# Patient Record
Sex: MALE | Race: WHITE | NOT HISPANIC OR LATINO | Employment: OTHER | URBAN - METROPOLITAN AREA
[De-identification: names, ages, dates, MRNs, and addresses within clinical notes are randomized per-mention and may not be internally consistent; named-entity substitution may affect disease eponyms.]

---

## 2024-04-19 ENCOUNTER — HOSPITAL ENCOUNTER (OUTPATIENT)
Facility: MEDICAL CENTER | Age: 44
End: 2024-04-20
Attending: STUDENT IN AN ORGANIZED HEALTH CARE EDUCATION/TRAINING PROGRAM | Admitting: STUDENT IN AN ORGANIZED HEALTH CARE EDUCATION/TRAINING PROGRAM
Payer: COMMERCIAL

## 2024-04-19 DIAGNOSIS — S02.5XXA CLOSED FRACTURE OF TOOTH, INITIAL ENCOUNTER: ICD-10-CM

## 2024-04-19 DIAGNOSIS — H74.8X2 HEMATOTYMPANUM OF LEFT EAR: ICD-10-CM

## 2024-04-19 DIAGNOSIS — R55 SYNCOPE, UNSPECIFIED SYNCOPE TYPE: ICD-10-CM

## 2024-04-19 DIAGNOSIS — D67 HEMOPHILIA B (HCC): ICD-10-CM

## 2024-04-19 DIAGNOSIS — S01.81XA CHIN LACERATION, INITIAL ENCOUNTER: ICD-10-CM

## 2024-04-19 DIAGNOSIS — S09.90XA CLOSED HEAD INJURY, INITIAL ENCOUNTER: ICD-10-CM

## 2024-04-19 PROCEDURE — 304217 HCHG IRRIGATION SYSTEM

## 2024-04-19 PROCEDURE — 36415 COLL VENOUS BLD VENIPUNCTURE: CPT

## 2024-04-19 PROCEDURE — 85576 BLOOD PLATELET AGGREGATION: CPT | Mod: 91

## 2024-04-19 PROCEDURE — 86850 RBC ANTIBODY SCREEN: CPT

## 2024-04-19 PROCEDURE — 96375 TX/PRO/DX INJ NEW DRUG ADDON: CPT

## 2024-04-19 PROCEDURE — 303747 HCHG EXTRA SUTURE

## 2024-04-19 PROCEDURE — 96376 TX/PRO/DX INJ SAME DRUG ADON: CPT

## 2024-04-19 PROCEDURE — 80053 COMPREHEN METABOLIC PANEL: CPT

## 2024-04-19 PROCEDURE — 99285 EMERGENCY DEPT VISIT HI MDM: CPT

## 2024-04-19 PROCEDURE — 96366 THER/PROPH/DIAG IV INF ADDON: CPT

## 2024-04-19 PROCEDURE — 85347 COAGULATION TIME ACTIVATED: CPT

## 2024-04-19 PROCEDURE — 85025 COMPLETE CBC W/AUTO DIFF WBC: CPT

## 2024-04-19 PROCEDURE — 86900 BLOOD TYPING SEROLOGIC ABO: CPT

## 2024-04-19 PROCEDURE — 86901 BLOOD TYPING SEROLOGIC RH(D): CPT

## 2024-04-19 PROCEDURE — 85384 FIBRINOGEN ACTIVITY: CPT | Mod: 91

## 2024-04-19 PROCEDURE — 84484 ASSAY OF TROPONIN QUANT: CPT

## 2024-04-19 PROCEDURE — 96365 THER/PROPH/DIAG IV INF INIT: CPT

## 2024-04-19 PROCEDURE — 304999 HCHG REPAIR-SIMPLE/INTERMED LEVEL 1

## 2024-04-20 ENCOUNTER — APPOINTMENT (OUTPATIENT)
Dept: RADIOLOGY | Facility: MEDICAL CENTER | Age: 44
End: 2024-04-20
Attending: STUDENT IN AN ORGANIZED HEALTH CARE EDUCATION/TRAINING PROGRAM
Payer: COMMERCIAL

## 2024-04-20 ENCOUNTER — PHARMACY VISIT (OUTPATIENT)
Dept: PHARMACY | Facility: MEDICAL CENTER | Age: 44
End: 2024-04-20
Payer: COMMERCIAL

## 2024-04-20 ENCOUNTER — APPOINTMENT (OUTPATIENT)
Dept: CARDIOLOGY | Facility: MEDICAL CENTER | Age: 44
End: 2024-04-20
Attending: HOSPITALIST
Payer: COMMERCIAL

## 2024-04-20 VITALS
SYSTOLIC BLOOD PRESSURE: 131 MMHG | HEIGHT: 74 IN | DIASTOLIC BLOOD PRESSURE: 84 MMHG | WEIGHT: 209 LBS | RESPIRATION RATE: 16 BRPM | TEMPERATURE: 98.6 F | OXYGEN SATURATION: 94 % | HEART RATE: 72 BPM | BODY MASS INDEX: 26.82 KG/M2
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
## 2024-04-20 PROBLEM — R55 SYNCOPE: Status: ACTIVE | Noted: 2024-04-20

## 2024-04-20 PROBLEM — E87.6 HYPOKALEMIA: Status: RESOLVED | Noted: 2024-04-20 | Resolved: 2024-04-20

## 2024-04-20 PROBLEM — S02.670A: Status: ACTIVE | Noted: 2024-04-20
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
## 2024-04-20 PROBLEM — D67 HEMOPHILIA B (HCC): Status: ACTIVE | Noted: 2024-04-20

## 2024-04-20 PROBLEM — H74.8X9 HEMOTYMPANUM: Status: ACTIVE | Noted: 2024-04-20

## 2024-04-20 PROBLEM — E87.6 HYPOKALEMIA: Status: ACTIVE | Noted: 2024-04-20

## 2024-04-20 PROBLEM — R55 SYNCOPE: Status: RESOLVED | Noted: 2024-04-20 | Resolved: 2024-04-20

## 2024-04-20 LAB
ABO + RH BLD: NORMAL
ABO GROUP BLD: NORMAL
ALBUMIN SERPL BCP-MCNC: 4.2 G/DL (ref 3.2–4.9)
ALBUMIN SERPL BCP-MCNC: 4.2 G/DL (ref 3.2–4.9)
ALBUMIN/GLOB SERPL: 1.4 G/DL
ALBUMIN/GLOB SERPL: 1.6 G/DL
ALP SERPL-CCNC: 58 U/L (ref 30–99)
ALP SERPL-CCNC: 59 U/L (ref 30–99)
ALT SERPL-CCNC: 27 U/L (ref 2–50)
ALT SERPL-CCNC: 28 U/L (ref 2–50)
ANION GAP SERPL CALC-SCNC: 11 MMOL/L (ref 7–16)
ANION GAP SERPL CALC-SCNC: 18 MMOL/L (ref 7–16)
APTT PPP: 31.4 SEC (ref 24.7–36)
AST SERPL-CCNC: 24 U/L (ref 12–45)
AST SERPL-CCNC: 29 U/L (ref 12–45)
BASOPHILS # BLD AUTO: 0.4 % (ref 0–1.8)
BASOPHILS # BLD AUTO: 0.7 % (ref 0–1.8)
BASOPHILS # BLD: 0.04 K/UL (ref 0–0.12)
BASOPHILS # BLD: 0.07 K/UL (ref 0–0.12)
BILIRUB SERPL-MCNC: 0.2 MG/DL (ref 0.1–1.5)
BILIRUB SERPL-MCNC: 0.4 MG/DL (ref 0.1–1.5)
BLD GP AB SCN SERPL QL: NORMAL
BUN SERPL-MCNC: 15 MG/DL (ref 8–22)
BUN SERPL-MCNC: 16 MG/DL (ref 8–22)
CALCIUM ALBUM COR SERPL-MCNC: 8.7 MG/DL (ref 8.5–10.5)
CALCIUM ALBUM COR SERPL-MCNC: 9 MG/DL (ref 8.5–10.5)
CALCIUM SERPL-MCNC: 8.9 MG/DL (ref 8.5–10.5)
CALCIUM SERPL-MCNC: 9.2 MG/DL (ref 8.5–10.5)
CFT BLD TEG: 5.7 MIN (ref 4.6–9.1)
CFT P HPASE BLD TEG: 3.9 MIN (ref 4.3–8.3)
CHLORIDE SERPL-SCNC: 103 MMOL/L (ref 96–112)
CHLORIDE SERPL-SCNC: 106 MMOL/L (ref 96–112)
CLOT ANGLE BLD TEG: 75.2 DEGREES (ref 63–78)
CLOT LYSIS 30M P MA LENFR BLD TEG: 0.2 % (ref 0–2.6)
CO2 SERPL-SCNC: 18 MMOL/L (ref 20–33)
CO2 SERPL-SCNC: 25 MMOL/L (ref 20–33)
CREAT SERPL-MCNC: 0.69 MG/DL (ref 0.5–1.4)
CREAT SERPL-MCNC: 0.9 MG/DL (ref 0.5–1.4)
CT.EXTRINSIC BLD ROTEM: 1.1 MIN (ref 0.8–2.1)
EKG IMPRESSION: NORMAL
EOSINOPHIL # BLD AUTO: 0.17 K/UL (ref 0–0.51)
EOSINOPHIL # BLD AUTO: 0.21 K/UL (ref 0–0.51)
EOSINOPHIL NFR BLD: 1.6 % (ref 0–6.9)
EOSINOPHIL NFR BLD: 2 % (ref 0–6.9)
ERYTHROCYTE [DISTWIDTH] IN BLOOD BY AUTOMATED COUNT: 40.7 FL (ref 35.9–50)
ERYTHROCYTE [DISTWIDTH] IN BLOOD BY AUTOMATED COUNT: 41.6 FL (ref 35.9–50)
FACT IX ACT/NOR PPP: 106 % (ref 75–125)
FACT VIII ACT/NOR PPP: 340 % (ref 45–145)
FLUAV RNA SPEC QL NAA+PROBE: NEGATIVE
FLUBV RNA SPEC QL NAA+PROBE: NEGATIVE
GFR SERPLBLD CREATININE-BSD FMLA CKD-EPI: 108 ML/MIN/1.73 M 2
GFR SERPLBLD CREATININE-BSD FMLA CKD-EPI: 117 ML/MIN/1.73 M 2
GLOBULIN SER CALC-MCNC: 2.7 G/DL (ref 1.9–3.5)
GLOBULIN SER CALC-MCNC: 2.9 G/DL (ref 1.9–3.5)
GLUCOSE SERPL-MCNC: 128 MG/DL (ref 65–99)
GLUCOSE SERPL-MCNC: 95 MG/DL (ref 65–99)
HCT VFR BLD AUTO: 42.4 % (ref 42–52)
HCT VFR BLD AUTO: 43.4 % (ref 42–52)
HGB BLD-MCNC: 14.4 G/DL (ref 14–18)
HGB BLD-MCNC: 14.9 G/DL (ref 14–18)
IMM GRANULOCYTES # BLD AUTO: 0.02 K/UL (ref 0–0.11)
IMM GRANULOCYTES # BLD AUTO: 0.03 K/UL (ref 0–0.11)
IMM GRANULOCYTES NFR BLD AUTO: 0.2 % (ref 0–0.9)
IMM GRANULOCYTES NFR BLD AUTO: 0.3 % (ref 0–0.9)
INHIBITOR INDICATED 1863: NO
INHIBITOR INDICATED 1863: NO
INR PPP: 0.94 (ref 0.87–1.13)
LV EJECT FRACT  99904: 58
LV EJECT FRACT MOD 2C 99903: 59.95
LV EJECT FRACT MOD 4C 99902: 55.13
LV EJECT FRACT MOD BP 99901: 57.91
LYMPHOCYTES # BLD AUTO: 2.4 K/UL (ref 1–4.8)
LYMPHOCYTES # BLD AUTO: 3.81 K/UL (ref 1–4.8)
LYMPHOCYTES NFR BLD: 22 % (ref 22–41)
LYMPHOCYTES NFR BLD: 36.8 % (ref 22–41)
MCF BLD TEG: 63.1 MM (ref 52–69)
MCF.PLATELET INHIB BLD ROTEM: 20 MM (ref 15–32)
MCH RBC QN AUTO: 30.2 PG (ref 27–33)
MCH RBC QN AUTO: 30.8 PG (ref 27–33)
MCHC RBC AUTO-ENTMCNC: 34 G/DL (ref 32.3–36.5)
MCHC RBC AUTO-ENTMCNC: 34.3 G/DL (ref 32.3–36.5)
MCV RBC AUTO: 88.9 FL (ref 81.4–97.8)
MCV RBC AUTO: 89.9 FL (ref 81.4–97.8)
MONOCYTES # BLD AUTO: 0.56 K/UL (ref 0–0.85)
MONOCYTES # BLD AUTO: 0.73 K/UL (ref 0–0.85)
MONOCYTES NFR BLD AUTO: 5.4 % (ref 0–13.4)
MONOCYTES NFR BLD AUTO: 6.7 % (ref 0–13.4)
NEUTROPHILS # BLD AUTO: 5.67 K/UL (ref 1.82–7.42)
NEUTROPHILS # BLD AUTO: 7.54 K/UL (ref 1.82–7.42)
NEUTROPHILS NFR BLD: 54.8 % (ref 44–72)
NEUTROPHILS NFR BLD: 69.1 % (ref 44–72)
NRBC # BLD AUTO: 0 K/UL
NRBC # BLD AUTO: 0 K/UL
NRBC BLD-RTO: 0 /100 WBC (ref 0–0.2)
NRBC BLD-RTO: 0 /100 WBC (ref 0–0.2)
PA AA BLD-ACNC: 0 % (ref 0–11)
PA ADP BLD-ACNC: 12.3 % (ref 0–17)
PLATELET # BLD AUTO: 243 K/UL (ref 164–446)
PLATELET # BLD AUTO: 287 K/UL (ref 164–446)
PMV BLD AUTO: 11.3 FL (ref 9–12.9)
PMV BLD AUTO: 11.6 FL (ref 9–12.9)
POTASSIUM SERPL-SCNC: 3.3 MMOL/L (ref 3.6–5.5)
POTASSIUM SERPL-SCNC: 3.9 MMOL/L (ref 3.6–5.5)
PROT SERPL-MCNC: 6.9 G/DL (ref 6–8.2)
PROT SERPL-MCNC: 7.1 G/DL (ref 6–8.2)
PROTHROMBIN TIME: 12.7 SEC (ref 12–14.6)
RBC # BLD AUTO: 4.77 M/UL (ref 4.7–6.1)
RBC # BLD AUTO: 4.83 M/UL (ref 4.7–6.1)
RH BLD: NORMAL
RSV RNA SPEC QL NAA+PROBE: NEGATIVE
SARS-COV-2 RNA RESP QL NAA+PROBE: NOTDETECTED
SODIUM SERPL-SCNC: 139 MMOL/L (ref 135–145)
SODIUM SERPL-SCNC: 142 MMOL/L (ref 135–145)
TEG ALGORITHM TGALG: ABNORMAL
TROPONIN T SERPL-MCNC: <6 NG/L (ref 6–19)
WBC # BLD AUTO: 10.4 K/UL (ref 4.8–10.8)
WBC # BLD AUTO: 10.9 K/UL (ref 4.8–10.8)

## 2024-04-20 PROCEDURE — 0241U HCHG SARS-COV-2 COVID-19 NFCT DS RESP RNA 4 TRGT ED POC: CPT

## 2024-04-20 PROCEDURE — 303747 HCHG EXTRA SUTURE

## 2024-04-20 PROCEDURE — 96366 THER/PROPH/DIAG IV INF ADDON: CPT

## 2024-04-20 PROCEDURE — 93306 TTE W/DOPPLER COMPLETE: CPT

## 2024-04-20 PROCEDURE — 99223 1ST HOSP IP/OBS HIGH 75: CPT | Performed by: STUDENT IN AN ORGANIZED HEALTH CARE EDUCATION/TRAINING PROGRAM

## 2024-04-20 PROCEDURE — 96375 TX/PRO/DX INJ NEW DRUG ADDON: CPT

## 2024-04-20 PROCEDURE — 700111 HCHG RX REV CODE 636 W/ 250 OVERRIDE (IP): Mod: JZ | Performed by: STUDENT IN AN ORGANIZED HEALTH CARE EDUCATION/TRAINING PROGRAM

## 2024-04-20 PROCEDURE — 304999 HCHG REPAIR-SIMPLE/INTERMED LEVEL 1

## 2024-04-20 PROCEDURE — 304217 HCHG IRRIGATION SYSTEM

## 2024-04-20 PROCEDURE — A9270 NON-COVERED ITEM OR SERVICE: HCPCS | Performed by: STUDENT IN AN ORGANIZED HEALTH CARE EDUCATION/TRAINING PROGRAM

## 2024-04-20 PROCEDURE — 96365 THER/PROPH/DIAG IV INF INIT: CPT

## 2024-04-20 PROCEDURE — 85250 CLOT FACTOR IX PTC/CHRSTMAS: CPT

## 2024-04-20 PROCEDURE — 700101 HCHG RX REV CODE 250: Performed by: STUDENT IN AN ORGANIZED HEALTH CARE EDUCATION/TRAINING PROGRAM

## 2024-04-20 PROCEDURE — 700105 HCHG RX REV CODE 258: Performed by: STUDENT IN AN ORGANIZED HEALTH CARE EDUCATION/TRAINING PROGRAM

## 2024-04-20 PROCEDURE — 85270 CLOT FACTOR XI PTA: CPT

## 2024-04-20 PROCEDURE — 700101 HCHG RX REV CODE 250: Performed by: HOSPITALIST

## 2024-04-20 PROCEDURE — G0378 HOSPITAL OBSERVATION PER HR: HCPCS

## 2024-04-20 PROCEDURE — 80053 COMPREHEN METABOLIC PANEL: CPT

## 2024-04-20 PROCEDURE — 700102 HCHG RX REV CODE 250 W/ 637 OVERRIDE(OP): Performed by: STUDENT IN AN ORGANIZED HEALTH CARE EDUCATION/TRAINING PROGRAM

## 2024-04-20 PROCEDURE — 93005 ELECTROCARDIOGRAM TRACING: CPT | Performed by: STUDENT IN AN ORGANIZED HEALTH CARE EDUCATION/TRAINING PROGRAM

## 2024-04-20 PROCEDURE — 36415 COLL VENOUS BLD VENIPUNCTURE: CPT

## 2024-04-20 PROCEDURE — 93306 TTE W/DOPPLER COMPLETE: CPT | Mod: 26 | Performed by: INTERNAL MEDICINE

## 2024-04-20 PROCEDURE — RXMED WILLOW AMBULATORY MEDICATION CHARGE: Performed by: HOSPITALIST

## 2024-04-20 PROCEDURE — 85610 PROTHROMBIN TIME: CPT

## 2024-04-20 PROCEDURE — 70450 CT HEAD/BRAIN W/O DYE: CPT

## 2024-04-20 PROCEDURE — 71045 X-RAY EXAM CHEST 1 VIEW: CPT

## 2024-04-20 PROCEDURE — 85730 THROMBOPLASTIN TIME PARTIAL: CPT

## 2024-04-20 PROCEDURE — 85240 CLOT FACTOR VIII AHG 1 STAGE: CPT

## 2024-04-20 PROCEDURE — 85025 COMPLETE CBC W/AUTO DIFF WBC: CPT

## 2024-04-20 PROCEDURE — 51798 US URINE CAPACITY MEASURE: CPT

## 2024-04-20 PROCEDURE — 72125 CT NECK SPINE W/O DYE: CPT

## 2024-04-20 PROCEDURE — 96376 TX/PRO/DX INJ SAME DRUG ADON: CPT

## 2024-04-20 PROCEDURE — 70486 CT MAXILLOFACIAL W/O DYE: CPT

## 2024-04-20 RX ORDER — LORATADINE 10 MG/1
10 TABLET ORAL DAILY
COMMUNITY

## 2024-04-20 RX ORDER — LOSARTAN POTASSIUM 50 MG/1
25 TABLET ORAL DAILY
Status: SHIPPED
Start: 2024-04-20

## 2024-04-20 RX ORDER — HYDROMORPHONE HYDROCHLORIDE 1 MG/ML
0.5 INJECTION, SOLUTION INTRAMUSCULAR; INTRAVENOUS; SUBCUTANEOUS ONCE
Status: COMPLETED | OUTPATIENT
Start: 2024-04-20 | End: 2024-04-20

## 2024-04-20 RX ORDER — POTASSIUM CHLORIDE 20 MEQ/1
40 TABLET, EXTENDED RELEASE ORAL ONCE
Status: DISCONTINUED | OUTPATIENT
Start: 2024-04-20 | End: 2024-04-20 | Stop reason: HOSPADM

## 2024-04-20 RX ORDER — OMEPRAZOLE 40 MG/1
40 CAPSULE, DELAYED RELEASE ORAL DAILY
COMMUNITY

## 2024-04-20 RX ORDER — MORPHINE SULFATE 4 MG/ML
4 INJECTION INTRAVENOUS EVERY 4 HOURS PRN
Status: DISCONTINUED | OUTPATIENT
Start: 2024-04-20 | End: 2024-04-20 | Stop reason: HOSPADM

## 2024-04-20 RX ORDER — ACETAMINOPHEN 10 MG/ML
1000 INJECTION, SOLUTION INTRAVENOUS ONCE
Status: COMPLETED | OUTPATIENT
Start: 2024-04-20 | End: 2024-04-20

## 2024-04-20 RX ORDER — POTASSIUM CHLORIDE 7.45 MG/ML
10 INJECTION INTRAVENOUS
Status: COMPLETED | OUTPATIENT
Start: 2024-04-20 | End: 2024-04-20

## 2024-04-20 RX ORDER — ACETAMINOPHEN 500 MG
1000 TABLET ORAL ONCE
Status: DISCONTINUED | OUTPATIENT
Start: 2024-04-20 | End: 2024-04-20

## 2024-04-20 RX ORDER — LIDOCAINE HYDROCHLORIDE AND EPINEPHRINE 10; 10 MG/ML; UG/ML
10 INJECTION, SOLUTION INFILTRATION; PERINEURAL ONCE
Status: COMPLETED | OUTPATIENT
Start: 2024-04-20 | End: 2024-04-20

## 2024-04-20 RX ORDER — SODIUM CHLORIDE, SODIUM LACTATE, POTASSIUM CHLORIDE, CALCIUM CHLORIDE 600; 310; 30; 20 MG/100ML; MG/100ML; MG/100ML; MG/100ML
1000 INJECTION, SOLUTION INTRAVENOUS ONCE
Status: COMPLETED | OUTPATIENT
Start: 2024-04-20 | End: 2024-04-20

## 2024-04-20 RX ORDER — OXYCODONE HYDROCHLORIDE 5 MG/1
5 TABLET ORAL EVERY 4 HOURS PRN
Status: DISCONTINUED | OUTPATIENT
Start: 2024-04-20 | End: 2024-04-20 | Stop reason: HOSPADM

## 2024-04-20 RX ORDER — LOSARTAN POTASSIUM 50 MG/1
50 TABLET ORAL DAILY
Status: ON HOLD | COMMUNITY
End: 2024-04-20

## 2024-04-20 RX ORDER — SODIUM CHLORIDE 9 MG/ML
INJECTION, SOLUTION INTRAVENOUS CONTINUOUS
Status: ACTIVE | OUTPATIENT
Start: 2024-04-20 | End: 2024-04-20

## 2024-04-20 RX ORDER — HYDROMORPHONE HYDROCHLORIDE 1 MG/ML
1 INJECTION, SOLUTION INTRAMUSCULAR; INTRAVENOUS; SUBCUTANEOUS ONCE
Status: COMPLETED | OUTPATIENT
Start: 2024-04-20 | End: 2024-04-20

## 2024-04-20 RX ORDER — ACETAMINOPHEN 325 MG/1
650 TABLET ORAL EVERY 6 HOURS PRN
Status: DISCONTINUED | OUTPATIENT
Start: 2024-04-20 | End: 2024-04-20 | Stop reason: HOSPADM

## 2024-04-20 RX ORDER — CIPROFLOXACIN AND DEXAMETHASONE 3; 1 MG/ML; MG/ML
4 SUSPENSION/ DROPS AURICULAR (OTIC) 2 TIMES DAILY
Status: DISCONTINUED | OUTPATIENT
Start: 2024-04-20 | End: 2024-04-20 | Stop reason: HOSPADM

## 2024-04-20 RX ORDER — CIPROFLOXACIN AND DEXAMETHASONE 3; 1 MG/ML; MG/ML
4 SUSPENSION/ DROPS AURICULAR (OTIC) 2 TIMES DAILY
Qty: 7.5 ML | Refills: 0 | Status: SHIPPED | OUTPATIENT
Start: 2024-04-20 | End: 2024-04-27

## 2024-04-20 RX ORDER — DEXTROAMPHETAMINE SACCHARATE, AMPHETAMINE ASPARTATE, DEXTROAMPHETAMINE SULFATE AND AMPHETAMINE SULFATE 2.5; 2.5; 2.5; 2.5 MG/1; MG/1; MG/1; MG/1
10 TABLET ORAL DAILY
COMMUNITY

## 2024-04-20 RX ORDER — OXYCODONE HYDROCHLORIDE 5 MG/1
5 TABLET ORAL EVERY 6 HOURS PRN
Qty: 20 TABLET | Refills: 0 | Status: SHIPPED | OUTPATIENT
Start: 2024-04-20 | End: 2024-04-25

## 2024-04-20 RX ADMIN — OXYCODONE 5 MG: 5 TABLET ORAL at 09:39

## 2024-04-20 RX ADMIN — POTASSIUM CHLORIDE 10 MEQ: 2 INJECTION, SOLUTION, CONCENTRATE INTRAVENOUS at 05:17

## 2024-04-20 RX ADMIN — SODIUM CHLORIDE: 9 INJECTION, SOLUTION INTRAVENOUS at 05:15

## 2024-04-20 RX ADMIN — CIPROFLOXACIN AND DEXAMETHASONE 4 DROP: 3; 1 SUSPENSION/ DROPS AURICULAR (OTIC) at 11:21

## 2024-04-20 RX ADMIN — COAGULATION FACTOR IX (RECOMBINANT) 500 UNITS: KIT at 01:30

## 2024-04-20 RX ADMIN — POTASSIUM CHLORIDE 10 MEQ: 2 INJECTION, SOLUTION, CONCENTRATE INTRAVENOUS at 07:51

## 2024-04-20 RX ADMIN — HYDROMORPHONE HYDROCHLORIDE 1 MG: 1 INJECTION, SOLUTION INTRAMUSCULAR; INTRAVENOUS; SUBCUTANEOUS at 02:16

## 2024-04-20 RX ADMIN — MORPHINE SULFATE 4 MG: 4 INJECTION INTRAVENOUS at 06:01

## 2024-04-20 RX ADMIN — MORPHINE SULFATE 4 MG: 4 INJECTION INTRAVENOUS at 10:23

## 2024-04-20 RX ADMIN — LIDOCAINE HYDROCHLORIDE AND EPINEPHRINE 10 ML: 10; 10 INJECTION, SOLUTION INFILTRATION; PERINEURAL at 01:02

## 2024-04-20 RX ADMIN — HYDROMORPHONE HYDROCHLORIDE 0.5 MG: 1 INJECTION, SOLUTION INTRAMUSCULAR; INTRAVENOUS; SUBCUTANEOUS at 01:10

## 2024-04-20 RX ADMIN — POTASSIUM CHLORIDE 10 MEQ: 2 INJECTION, SOLUTION, CONCENTRATE INTRAVENOUS at 04:03

## 2024-04-20 RX ADMIN — HYDROMORPHONE HYDROCHLORIDE 0.5 MG: 1 INJECTION, SOLUTION INTRAMUSCULAR; INTRAVENOUS; SUBCUTANEOUS at 00:17

## 2024-04-20 RX ADMIN — SODIUM CHLORIDE, POTASSIUM CHLORIDE, SODIUM LACTATE AND CALCIUM CHLORIDE 1000 ML: 600; 310; 30; 20 INJECTION, SOLUTION INTRAVENOUS at 00:39

## 2024-04-20 RX ADMIN — ACETAMINOPHEN 1000 MG: 1000 INJECTION INTRAVENOUS at 02:26

## 2024-04-20 RX ADMIN — COAGULATION FACTOR IX (RECOMBINANT) 8000 UNITS: KIT at 01:23

## 2024-04-20 ASSESSMENT — PATIENT HEALTH QUESTIONNAIRE - PHQ9
SUM OF ALL RESPONSES TO PHQ9 QUESTIONS 1 AND 2: 0
1. LITTLE INTEREST OR PLEASURE IN DOING THINGS: NOT AT ALL
2. FEELING DOWN, DEPRESSED, IRRITABLE, OR HOPELESS: NOT AT ALL

## 2024-04-20 ASSESSMENT — PAIN DESCRIPTION - PAIN TYPE
TYPE: ACUTE PAIN

## 2024-04-20 ASSESSMENT — LIFESTYLE VARIABLES
HOW MANY TIMES IN THE PAST YEAR HAVE YOU HAD 5 OR MORE DRINKS IN A DAY: 0
DOES PATIENT WANT TO STOP DRINKING: NO
EVER FELT BAD OR GUILTY ABOUT YOUR DRINKING: NO
SUBSTANCE_ABUSE: 0
TOTAL SCORE: 0
ON A TYPICAL DAY WHEN YOU DRINK ALCOHOL HOW MANY DRINKS DO YOU HAVE: 2
ALCOHOL_USE: YES
HAVE PEOPLE ANNOYED YOU BY CRITICIZING YOUR DRINKING: NO
CONSUMPTION TOTAL: NEGATIVE
EVER HAD A DRINK FIRST THING IN THE MORNING TO STEADY YOUR NERVES TO GET RID OF A HANGOVER: NO
HAVE YOU EVER FELT YOU SHOULD CUT DOWN ON YOUR DRINKING: NO
AVERAGE NUMBER OF DAYS PER WEEK YOU HAVE A DRINK CONTAINING ALCOHOL: 0

## 2024-04-20 ASSESSMENT — ENCOUNTER SYMPTOMS
ORTHOPNEA: 0
EYE PAIN: 0
DEPRESSION: 0
DIZZINESS: 0
HEMOPTYSIS: 0
HALLUCINATIONS: 0
CHILLS: 0
NECK PAIN: 0
NERVOUS/ANXIOUS: 1
TINGLING: 0
ABDOMINAL PAIN: 0
SENSORY CHANGE: 0
FEVER: 0
FALLS: 1
INSOMNIA: 0
PHOTOPHOBIA: 0
MEMORY LOSS: 0
LOSS OF CONSCIOUSNESS: 1
PALPITATIONS: 0
SHORTNESS OF BREATH: 0
HEARTBURN: 0
NAUSEA: 0
DOUBLE VISION: 0
SINUS PAIN: 0
SPUTUM PRODUCTION: 0
MYALGIAS: 0
VOMITING: 0
EYE DISCHARGE: 0
BACK PAIN: 0
TREMORS: 0
DIARRHEA: 0
BRUISES/BLEEDS EASILY: 0
DIZZINESS: 1
COUGH: 0
STRIDOR: 0
EYE REDNESS: 0
WEIGHT LOSS: 0
HEADACHES: 1

## 2024-04-20 ASSESSMENT — FIBROSIS 4 INDEX: FIB4 SCORE: 0.82

## 2024-04-20 NOTE — H&P
Hospital Medicine History & Physical Note    Date of Service  4/20/2024    Primary Care Physician  Pcp Not In Computer    Consultants  Hematology    Specialist Names: Dr. Nava    Code Status  Full Code    Chief Complaint  Chief Complaint   Patient presents with    T-5000 FALL     BIB [facility] from [facility]. Stood up-syncope and fell. Laceration on the chin, no active bleed.. Patient appears uneasy, confused. Has hemophilia.        History of Presenting Illness  Tim Slater is a 43 y.o. male with past med history of hemophilia B who presented 4/19/2024 after ground-level fall.  Patient is here visiting from [place].  He was at the [employer] earlier today and he was drinking some alcohol.  States that he only had 1 drink.  Patient states that he was initially sitting at the bar drinking, he began to feel clammy and a bit nauseous.  [place] he states that his symptoms persisted.  As result he sat back down.  Upon standing, patient states that he had a syncopal episode.  Patient states that he has been feeling unwell over the last day or 2.  This is also paired with anxiety as well.  CT head was unremarkable CT C-spine was also negative.  CT maxillofacial was unremarkable.  He does have some dental fracture.  Given his hemophilia B, case was discussed with hematology.  They recommended 8000 units of factor IX.    I discussed the plan of care with patient and family.    Review of Systems  Review of Systems   Constitutional:  Negative for chills and fever.   HENT:  Negative for congestion, ear discharge, ear pain, nosebleeds, sinus pain and tinnitus.    Eyes:  Negative for photophobia, pain and discharge.   Respiratory:  Negative for cough, hemoptysis, sputum production and shortness of breath.    Cardiovascular:  Negative for chest pain, palpitations and orthopnea.   Gastrointestinal:  Negative for abdominal pain, diarrhea, nausea and vomiting.   Genitourinary:  Negative for frequency,  hematuria and urgency.   Musculoskeletal:  Positive for falls. Negative for back pain, joint pain, myalgias and neck pain.   Neurological:  Positive for dizziness, loss of consciousness and headaches.   Psychiatric/Behavioral:  Negative for depression, hallucinations, memory loss, substance abuse and suicidal ideas. The patient is nervous/anxious. The patient does not have insomnia.        Past Medical History   has no past medical history on file.    Surgical History   has no past surgical history on file.     Family History  family history is not on file.   Family history reviewed with patient. There is no family history that is pertinent to the chief complaint.     Social History       Allergies  No Known Allergies    Medications  None       Physical Exam  Temp:  [36.7 °C (98 °F)] 36.7 °C (98 °F)  Pulse:  [] 96  Resp:  [16-35] 16  BP: (131-158)/(76-97) 132/76  SpO2:  [92 %-100 %] 96 %  Blood Pressure: 132/76   Temperature: 36.7 °C (98 °F)   Pulse: 96   Respiration: 16   Pulse Oximetry: 96 %       Physical Exam  Constitutional:       General: He is not in acute distress.     Appearance: Normal appearance. He is normal weight. He is not ill-appearing, toxic-appearing or diaphoretic.   HENT:      Head: Normocephalic.      Comments: Left shin abrasion, left hemotympanum      Mouth/Throat:      Mouth: Mucous membranes are moist.      Comments: Dental fracture present.  There are some dried blood as well  Eyes:      Pupils: Pupils are equal, round, and reactive to light.   Cardiovascular:      Rate and Rhythm: Normal rate and regular rhythm.      Pulses: Normal pulses.      Heart sounds: Normal heart sounds. No murmur heard.     No friction rub. No gallop.   Pulmonary:      Effort: Pulmonary effort is normal. No respiratory distress.      Breath sounds: Normal breath sounds. No stridor. No wheezing, rhonchi or rales.   Chest:      Chest wall: No tenderness.   Abdominal:      General: There is no distension.       "Palpations: There is no mass.      Tenderness: There is no abdominal tenderness. There is no right CVA tenderness, left CVA tenderness, guarding or rebound.      Hernia: No hernia is present.   Musculoskeletal:         General: No swelling, tenderness, deformity or signs of injury.      Right lower leg: No edema.      Left lower leg: No edema.   Skin:     General: Skin is warm and dry.      Capillary Refill: Capillary refill takes less than 2 seconds.      Coloration: Skin is not jaundiced or pale.      Findings: No bruising or erythema.   Neurological:      General: No focal deficit present.      Mental Status: He is alert and oriented to person, place, and time. Mental status is at baseline.      Cranial Nerves: No cranial nerve deficit.      Sensory: No sensory deficit.      Motor: No weakness.      Coordination: Coordination normal.   Psychiatric:         Mood and Affect: Mood normal.         Laboratory:  Recent Labs     04/19/24  2358   WBC 10.4   RBC 4.77   HEMOGLOBIN 14.4   HEMATOCRIT 42.4   MCV 88.9   MCH 30.2   MCHC 34.0   RDW 40.7   PLATELETCT 287   MPV 11.3     Recent Labs     04/19/24  2358   SODIUM 139   POTASSIUM 3.3*   CHLORIDE 103   CO2 18*   GLUCOSE 128*   BUN 16   CREATININE 0.90   CALCIUM 9.2     Recent Labs     04/19/24  2358   ALTSGPT 28   ASTSGOT 29   ALKPHOSPHAT 59   TBILIRUBIN 0.2   GLUCOSE 128*         No results for input(s): \"NTPROBNP\" in the last 72 hours.      Recent Labs     04/19/24  2358   TROPONINT <6       Imaging:  DX-CHEST-PORTABLE (1 VIEW)   Final Result         1. No acute cardiopulmonary abnormalities are identified.      CT-MAXILLOFACIAL W/O PLUS RECONS   Final Result            1. No acute maxillofacial fracture.      CT-CSPINE WITHOUT PLUS RECONS   Final Result         1. No acute fracture from C1 through T1 is visualized.         CT-HEAD W/O   Final Result         1. No acute intracranial abnormality. No evidence of acute intracranial hemorrhage or mass lesion.             "             X-Ray:  I have personally reviewed the images and compared with prior images.  EKG:  I have personally reviewed the images and compared with prior images.    Assessment/Plan:  Justification for Admission Status  I anticipate this patient is appropriate for observation status at this time because ground-level fall in setting of hemophilia B    Patient will need a Med/Surg bed on MEDICAL service .  The need is secondary to probable.    * Ground-level fall- (present on admission)  Assessment & Plan  Patient ground-level fall in the setting of alcohol usage while with at the [facility] earlier this evening.  Possibly orthostatic.  Continue with IV fluids.  Follow-up orthostatic vital signs  No evidence of fracture seen on CT imaging    Syncope  Assessment & Plan  Follow up echo  Follow up orthostatic vital signs     Hypokalemia  Assessment & Plan  Replete with IV and PO potassium for goal > 4.0    Closed fracture of mandible involving dental socket (HCC)  Assessment & Plan  Outpatient follow-up    Hemotympanum  Assessment & Plan  ENT consult in the morning    Hemophilia B (HCC)  Assessment & Plan  ER physician consulted Dr. Nava from hematology.  She had recommended single dose of 8000 unit of factor IX  Follow-up repeat factor IX levels  Patient be admitted for monitoring overnight        VTE prophylaxis: SCDs/TEDs
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
## 2024-04-20 NOTE — PROGRESS NOTES
Patient has been medically cleared for discharge. Ivs removed. Discharge instructions and paperwork reviewed with patient, verbalizes understanding. Meds sent to [id number], delivered to patient's bedside.   Ear drops sent to home pharmacy. All belongings in possession.

## 2024-04-20 NOTE — ED NOTES
Pt to [facility] from CT. Pt connected to monitor, given call light, and provided with warm blanket. Dressing on chin laceration changed.

## 2024-04-20 NOTE — ED PROVIDER NOTES
"ED Provider Note    CHIEF COMPLAINT  Chief Complaint   Patient presents with    T-5000 FALL     BIB [facility] from [facility]. Stood up-syncope and fell. Laceration on the chin, no active bleed.. Patient appears uneasy, confused. Has hemophilia.        EXTERNAL RECORDS REVIEWED  Care everywhere records reviewed from 2022 including hematology note noting patient's outpatient factor replacement regiment    HPI/ROS  LIMITATION TO HISTORY   Select: : None  OUTSIDE HISTORIAN(S):  Family significant other who reports that the patient has had 1 day of symptoms of nasal congestion and a runny nose, myalgias and fatigue which he thought was a cold.  She states that she witnessed his fall tonight where he stood up quickly took a few steps and then fell forward striking his face on the ground.    Tim Slater is a 43 y.o. male who presents to the emergency department for evaluation of a headache and \"not feeling right\" following a syncopal episode, fall with head strike.  The patient states that he drank 3 alcoholic beverages this evening.  He states that he was not feeling well, stood up quickly and then blacked out and cannot remember what exactly happened.  He is currently reporting a headache and pain in his chin.  He denies any pain in his neck, chest, back, abdomen or extremities.  He and wife deny any vomiting.  He has not administered any factor today.    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    FAMILY HISTORY  Family History   Problem Relation Age of Onset    Clotting Disorder Mother     Colon Cancer Maternal Uncle     Coronary artery disease Maternal Grandmother     Colon Cancer Paternal Grandfather        SOCIAL HISTORY  Social History     Tobacco Use    Smoking status: Never    Smokeless " "tobacco: Never   Vaping Use    Vaping Use: Never used   Substance and Sexual Activity    Alcohol use: Yes     Comment: occ    Drug use: Never    Sexual activity: Not on file       CURRENT MEDICATIONS  Home Medications       Reviewed by Jefferson Pedro (Pharmacy Tech) on 04/20/24 at 0458  Med List Status: Complete     Medication Last Dose Status   amphetamine-dextroamphetamine (ADDERALL) 10 MG Tab 4/19/2024 Active   loratadine (CLARITIN) 10 MG Tab FEW DAYS AGO Active   LOSARTAN POTASSIUM PO 4/19/2024 Active   omeprazole (PRILOSEC) 40 MG delayed-release capsule 4/19/2024 Active                    ALLERGIES  Allergies   Allergen Reactions    Nsaids Unspecified     Hemophilia       PHYSICAL EXAM  VITAL SIGNS: /82   Pulse 70   Temp 36.3 °C (97.4 °F) (Temporal)   Resp 17   Ht 1.88 m (6' 2\")   Wt 94.8 kg (208 lb 15.9 oz)   SpO2 95%   BMI 26.83 kg/m²    Constitutional: Anxious, writhing in the gurney  HEENT: 3 cm chin laceration with bleeding controlled otherwise atraumatic, no malocclusion, no TMJ crepitus.  There is some tenderness surrounding the laceration site.  Normocephalic, pupils are equal round reactive to light, nose normal, mouth shows moist mucous membranes.  Small amount of bleeding from the left external auditory canal.  Hemotympanum with a small perforation in the tympanic membrane on the left side.  No bleeding from the right external auditory canal.  Neck: No midline tenderness, full range of motion  Cardiovascular: Tachycardic, regular no murmur, rub or gallop, 2+ pulses peripherally x4  Thorax & Lungs: No respiratory distress, no wheezes, rales or rhonchi, no chest wall tenderness.  GI: Soft, non-distended, non-tender, no rebound  Skin: Warm, dry, laceration as above  Musculoskeletal: Moving all extremities, no acute deformity, no edema, no tenderness  Neurologic: A&Ox3, at baseline mentation, cranial nerves II through XII are grossly intact, moving all extremities, no focal deficits, no " obvious sensory deficit  Psychiatric: Appropriate affect for situation at this time      EKG/LABS  Labs Reviewed   COMP METABOLIC PANEL - Abnormal; Notable for the following components:       Result Value    Potassium 3.3 (*)     Co2 18 (*)     Anion Gap 18.0 (*)     Glucose 128 (*)     All other components within normal limits   PLATELET MAPPING WITH BASIC TEG - Abnormal; Notable for the following components:    React Time Initial Hep 3.9 (*)     All other components within normal limits   FACTOR VIII - Abnormal; Notable for the following components:    Factor VIII 340.0 (*)     All other components within normal limits   CBC WITH DIFFERENTIAL   COD (ADULT)   APTT   PROTHROMBIN TIME   COV-2, FLU A/B, AND RSV BY PCR ([facility])   TROPONIN   ESTIMATED GFR   FACTOR XI   ABO RH CONFIRM   CBC WITH DIFFERENTIAL   COMP METABOLIC PANEL   POC COV-2, FLU A/B, RSV BY PCR       Results for orders placed or performed during the hospital encounter of 24   EKG   Result Value Ref Range    Report       [facility] Emergency Dept.    Test Date:  2024  Pt Name:    NELLI LENTZ                 Department: ER  MRN:        [id number]                      Room:       [facility]  Gender:     Male                         Technician:  :        1980                   Requested By:ER TRIAGE PROTOCOL  Order #:    [id number]                    Reading MD: Ernesto Flowers    Measurements  Intervals                                Axis  Rate:       107                          P:          73  MT:         155                          QRS:        83  QRSD:       97                           T:          -64  QT:         313  QTc:        418    Interpretive Statements  sinus tachycardia at a rate of 107, normal axis, normal intervals, no ST  elevation or depression, early R wave progression.  No old for comparison.  Electronically Signed On 2024 00:30:42 PDT by Ernesto Flowers         I have independently interpreted  this EKG    RADIOLOGY/PROCEDURES   I have independently interpreted the diagnostic imaging associated with this visit and am waiting the final reading from the radiologist.   My preliminary interpretation is as follows: CT head with no intracranial hemorrhage.  CT maxillofacial study demonstrating no acute maxillofacial fracture.  CT cervical spine with no obvious vertebral fracture.    Radiologist interpretation:  DX-CHEST-PORTABLE (1 VIEW)   Final Result         1. No acute cardiopulmonary abnormalities are identified.      CT-MAXILLOFACIAL W/O PLUS RECONS   Final Result            1. No acute maxillofacial fracture.      CT-CSPINE WITHOUT PLUS RECONS   Final Result         1. No acute fracture from C1 through T1 is visualized.         CT-HEAD W/O   Final Result         1. No acute intracranial abnormality. No evidence of acute intracranial hemorrhage or mass lesion.                       Laceration Repair Procedure Note    Indication: Laceration    Procedure: The patient was placed in the appropriate position and anesthesia around the laceration was obtained by infiltration using 1% Lidocaine with epinephrine. The wound was minimally contaminated .The area was then irrigated with normal saline. The laceration was closed with 5-0 fast-absorbing gut. There were no additional lacerations requiring repair.     Total repaired wound length: 3 cm.     Other Items: Suture count: 6    The patient tolerated the procedure well.    Complications: bleeding      COURSE & MEDICAL DECISION MAKING    ASSESSMENT, COURSE AND PLAN  Care Narrative:     43-year-old patient with known hemophilia b, last factor IX level of around 14% presenting in the setting of a fall with head strike complaining of a headache and with a chin laceration.  Patient neurologically intact, hemodynamically stable.  Obviously significant risk for serious intracranial bleeding.  Case discussed promptly with pharmacy who found that we do have replacement  factor available as needed.  Hematology paged for consultation on appropriate dosage but likely will plan to replete to 100%.  Will plan for stat CT head face and cervical spine for further assessment.  With respect to patient's syncopal episode will perform broad laboratory workup, EKG and provide IV fluid resuscitation given alcohol use and history concerning for orthostatic event.    Case was discussed with Dr. Nava, hematology who agrees with plan for factor IX administration to 100% activity.  This was discussed with pharmacy and 8500 units were administered to the patient.  This resulted in significant slowing of bleeding from the left ear and chin.  Fortunately CT imaging demonstrated no acute traumatic injury.  Laceration was repaired at bedside following extensive irrigation and wound care instructions discussed.  Patient was ultimately admitted to the hospital with case discussed with admitting hospitalist Dr. Norris for ongoing evaluation.     CRITICAL CARE  The very real possibilty of a deterioration of this patient's condition required the highest level of my preparedness for sudden, emergent intervention.  I provided critical care services, which included medication orders, frequent reevaluations of the patient's condition and response to treatment, ordering and reviewing test results, and discussing the case with various consultants.  The critical care time associated with the care of the patient was 35 minutes. Review chart for interventions. This time is exclusive of any other billable procedures.       ADDITIONAL PROBLEMS MANAGED  None    DISPOSITION AND DISCUSSIONS  I have discussed management of the patient with the following physicians and BAM's: Dr. Nava, hematology and Dr. Norris, hospitalist    Decision tools and prescription drugs considered including, but not limited to: Pain Medications Dilaudid .    FINAL DIAGNOSIS  1. Closed fracture of tooth, initial encounter    2. Chin  laceration, initial encounter    3. Closed head injury, initial encounter    4. Hemophilia B (HCC)    5. Hematotympanum of left ear    6.     Critical care time 35 minutes       Electronically signed by: Ernesto Flowers M.D., 4/20/2024 12:04 AM

## 2024-04-20 NOTE — PROGRESS NOTES
Assumed care of patient, he is awake in bed. He denies pain but complains of soreness in the jaw, changed diet to soft/bite sized due to pain. Updated on plan of care.

## 2024-04-20 NOTE — ED NOTES
Med rec complete per patient  Allergies reviewed.   Outpatient antibiotics in the last 30 days? No   Anticoagulants taken in the last 14 days? No    Losartan dose verified per [facility] 4/20/24    Pharmacy patient utilizes: [facility] ([phone])    Rosa Beal CPhT

## 2024-04-20 NOTE — ED TRIAGE NOTES
Tim Slater  43 y.o. male  Chief Complaint   Patient presents with    T-5000 FALL     BIB [facility] from [facility]. Stood up-syncope and fell. Laceration on the chin, no active bleed.. Patient appears uneasy, confused. Has hemophilia.        Vitals:    04/20/24 0002   BP: (!) 158/89   Pulse: (!) 109   Resp: (!) 35   Temp: 36.7 °C (98 °F)   SpO2: 96%

## 2024-04-20 NOTE — ASSESSMENT & PLAN NOTE
Patient ground-level fall in the setting of alcohol usage while with at the [facility] [place] earlier this evening.  Possibly orthostatic.  Continue with IV fluids.  Follow-up orthostatic vital signs  No evidence of fracture seen on CT imaging

## 2024-04-20 NOTE — PROGRESS NOTES
4 Eyes Skin Assessment Completed by BIN Hensley and BIN Menjivar.    Head Laceration on chin from fall prior to being admitted.  Ears WDL  Nose WDL  Mouth WDL  Neck WDL  Breast/Chest WDL  Shoulder Blades WDL  Spine WDL  (R) Arm/Elbow/Hand WDL  (L) Arm/Elbow/Hand WDL  Abdomen WDL  Groin WDL  Scrotum/Coccyx/Buttocks WDL  (R) Leg WDL  (L) Leg WDL  (R) Heel/Foot/Toe WDL  (L) Heel/Foot/Toe WDL          Devices In Places Blood Pressure Cuff and Pulse Ox      Interventions In Place N/A    Possible Skin Injury No    Pictures Uploaded Into Epic N/A  Wound Consult Placed N/A  RN Wound Prevention Protocol Ordered Yes

## 2024-04-20 NOTE — ASSESSMENT & PLAN NOTE
ER physician consulted Dr. Nava from hematology.  She had recommended single dose of 8000 unit of factor IX  Follow-up repeat factor IX levels  Patient be admitted for monitoring overnight

## 2024-04-20 NOTE — CARE PLAN
Problem: Knowledge Deficit - Standard  Goal: Patient and family/care givers will demonstrate understanding of plan of care, disease process/condition, diagnostic tests and medications  Outcome: Progressing     Problem: Pain - Standard  Goal: Alleviation of pain or a reduction in pain to the patient’s comfort goal  Outcome: Progressing     Problem: Fall Risk  Goal: Patient will remain free from falls  Outcome: Progressing   The patient is Stable - Low risk of patient condition declining or worsening         Progress made toward(s) clinical / shift goals:  ENT consult    Patient is not progressing towards the following goals:

## 2024-04-21 NOTE — DISCHARGE SUMMARY
Discharge Summary    CHIEF COMPLAINT ON ADMISSION  Chief Complaint   Patient presents with    T-5000 FALL     BIB [facility] from [facility]. Stood up-syncope and fell. Laceration on the chin, no active bleed.. Patient appears uneasy, confused. Has hemophilia.        Reason for Admission  Ground-level fall     Admission Date  4/19/2024    CODE STATUS  Prior    HPI & HOSPITAL COURSE  Tim Slater is a 43 y.o. male with past med history of hemophilia B who presented 4/19/2024 after ground-level fall.  Patient is here visiting from [place].  He was at the [employer] earlier today and he was drinking some alcohol.  States that he only had 1 drink.  Patient states that he was initially sitting at the bar drinking, he began to feel clammy and a bit nauseous.  [place] he states that his symptoms persisted.  As result he sat back down.  Upon standing, patient states that he had a syncopal episode.  Patient states that he has been feeling unwell over the last day or 2.  This is also paired with anxiety as well.  CT head was unremarkable CT C-spine was also negative.  CT maxillofacial was unremarkable.  He does have some dental fracture.  Given his hemophilia B, case was discussed with hematology.  They recommended 8000 units of factor IX.    Patient had an echocardiogram that was within normal limits.  He had evidence of hematuria tympanum in the left ear and case was discussed with ENT Dr. Malloy.  She recommended ears antibiotic eyedrops which was initiated.  Patient was informed that if necessary she can follow-up with Dr. Malloy in the outpatient setting.    Case discussed with Dr. Nava of hematology oncology and patient having no evidence of any further bleeds patient was cleared for discharge home to follow-up with her PCP for further care and management    Patient instructed to cut back on his Cozaar from 50 mg back down to 25-minute p.o. daily       His syncope appears to be a vasovagal  phenomenon    Therefore, he is discharged in good and stable condition to home with close outpatient follow-up.    The patient recovered much more quickly than anticipated on admission.    Discharge Date  4/20/2024    FOLLOW UP ITEMS POST DISCHARGE      DISCHARGE DIAGNOSES  Principal Problem (Resolved):    Ground-level fall (POA: Yes)  Active Problems:    Hemophilia B (HCC) (POA: Yes)    Hemotympanum (POA: Yes)    Closed fracture of mandible involving dental socket (HCC) (POA: Yes)  Resolved Problems:    Hypokalemia (POA: Yes)    Syncope (POA: Yes)      FOLLOW UP  No future appointments.  No follow-up provider specified.    MEDICATIONS ON DISCHARGE     Medication List        START taking these medications        Instructions   ciprofloxacin/dexamethasone 0.3-0.1 % Susp  Commonly known as: Ciprodex   Administer 4 Drops into the left ear 2 times a day for 7 days.  Dose: 4 Drop     oxyCODONE immediate-release 5 MG Tabs  Commonly known as: Roxicodone   Take 1 Tablet by mouth every 6 hours as needed for Severe Pain for up to 5 days.  Dose: 5 mg            CHANGE how you take these medications        Instructions   losartan 50 MG Tabs  What changed: how much to take  Commonly known as: Cozaar   Take 0.5 Tablets by mouth every day.  Dose: 25 mg            CONTINUE taking these medications        Instructions   amphetamine-dextroamphetamine 10 MG Tabs  Commonly known as: Adderall   Take 10 mg by mouth every day.  Dose: 10 mg     loratadine 10 MG Tabs  Commonly known as: Claritin   Take 10 mg by mouth every day.  Dose: 10 mg     omeprazole 40 MG delayed-release capsule  Commonly known as: PriLOSEC   Take 40 mg by mouth every day.  Dose: 40 mg              Allergies  Allergies   Allergen Reactions    Nsaids Unspecified     Hemophilia       DIET  No orders of the defined types were placed in this encounter.      ACTIVITY  As tolerated.  Weight bearing as tolerated    CONSULTATIONS  Curbsided  dr bello    Hem/onc   chen      PROCEDURES  Echo    LABORATORY  Lab Results   Component Value Date    SODIUM 142 04/20/2024    POTASSIUM 3.9 04/20/2024    CHLORIDE 106 04/20/2024    CO2 25 04/20/2024    GLUCOSE 95 04/20/2024    BUN 15 04/20/2024    CREATININE 0.69 04/20/2024        Lab Results   Component Value Date    WBC 10.9 (H) 04/20/2024    HEMOGLOBIN 14.9 04/20/2024    HEMATOCRIT 43.4 04/20/2024    PLATELETCT 243 04/20/2024        Total time of the discharge process exceeds 38  minutes.

## 2024-04-23 LAB — FACT XI ACT/NOR PPP: 108 % (ref 56–153)
